# Patient Record
Sex: FEMALE | Race: BLACK OR AFRICAN AMERICAN
[De-identification: names, ages, dates, MRNs, and addresses within clinical notes are randomized per-mention and may not be internally consistent; named-entity substitution may affect disease eponyms.]

---

## 2018-04-18 ENCOUNTER — APPOINTMENT (OUTPATIENT)
Dept: NEPHROLOGY | Facility: CLINIC | Age: 58
End: 2018-04-18
Payer: SELF-PAY

## 2018-04-18 VITALS
BODY MASS INDEX: 30.96 KG/M2 | HEIGHT: 69 IN | DIASTOLIC BLOOD PRESSURE: 91 MMHG | HEART RATE: 76 BPM | WEIGHT: 209 LBS | SYSTOLIC BLOOD PRESSURE: 139 MMHG

## 2018-04-18 DIAGNOSIS — D63.1 CHRONIC KIDNEY DISEASE, STAGE 3 (MODERATE): ICD-10-CM

## 2018-04-18 DIAGNOSIS — N18.3 CHRONIC KIDNEY DISEASE, STAGE 3 (MODERATE): ICD-10-CM

## 2018-04-18 DIAGNOSIS — E87.2 ACIDOSIS: ICD-10-CM

## 2018-04-18 DIAGNOSIS — N18.5 CHRONIC KIDNEY DISEASE, STAGE 5: ICD-10-CM

## 2018-04-18 DIAGNOSIS — D63.1 ANEMIA IN CHRONIC KIDNEY DISEASE: ICD-10-CM

## 2018-04-18 PROCEDURE — 36415 COLL VENOUS BLD VENIPUNCTURE: CPT

## 2018-04-18 PROCEDURE — 99204 OFFICE O/P NEW MOD 45 MIN: CPT | Mod: 25

## 2018-04-19 PROBLEM — N18.3 ANEMIA IN STAGE 3 CHRONIC KIDNEY DISEASE: Status: ACTIVE | Noted: 2018-04-19

## 2018-04-19 LAB
25(OH)D3 SERPL-MCNC: 10.5 NG/ML
ALBUMIN SERPL ELPH-MCNC: 3.9 G/DL
ANION GAP SERPL CALC-SCNC: 13 MMOL/L
BASOPHILS # BLD AUTO: 0.02 K/UL
BASOPHILS NFR BLD AUTO: 0.6 %
BUN SERPL-MCNC: 9 MG/DL
CALCIUM SERPL-MCNC: 10.3 MG/DL
CHLORIDE SERPL-SCNC: 107 MMOL/L
CO2 SERPL-SCNC: 24 MMOL/L
CREAT SERPL-MCNC: 0.9 MG/DL
CREAT SPEC-SCNC: 156 MG/DL
CREAT/PROT UR: 0.1 RATIO
EOSINOPHIL # BLD AUTO: 0.12 K/UL
EOSINOPHIL NFR BLD AUTO: 3.9 %
GLUCOSE SERPL-MCNC: 131 MG/DL
HCT VFR BLD CALC: 35 %
HGB BLD-MCNC: 11.1 G/DL
IMM GRANULOCYTES NFR BLD AUTO: 0.3 %
LYMPHOCYTES # BLD AUTO: 0.59 K/UL
LYMPHOCYTES NFR BLD AUTO: 19 %
MAGNESIUM SERPL-MCNC: 1.5 MG/DL
MAN DIFF?: NORMAL
MCHC RBC-ENTMCNC: 25.6 PG
MCHC RBC-ENTMCNC: 31.7 GM/DL
MCV RBC AUTO: 80.6 FL
MONOCYTES # BLD AUTO: 0.22 K/UL
MONOCYTES NFR BLD AUTO: 7.1 %
NEUTROPHILS # BLD AUTO: 2.15 K/UL
NEUTROPHILS NFR BLD AUTO: 69.1 %
PHOSPHATE SERPL-MCNC: 2.4 MG/DL
PLATELET # BLD AUTO: 304 K/UL
POTASSIUM SERPL-SCNC: 4.4 MMOL/L
PROT UR-MCNC: 15 MG/DL
RBC # BLD: 4.34 M/UL
RBC # FLD: 14.4 %
SODIUM SERPL-SCNC: 144 MMOL/L
TACROLIMUS SERPL-MCNC: 17.2 NG/ML
URATE SERPL-MCNC: 5.3 MG/DL
WBC # FLD AUTO: 3.11 K/UL

## 2019-02-04 ENCOUNTER — OUTPATIENT (OUTPATIENT)
Dept: OUTPATIENT SERVICES | Facility: HOSPITAL | Age: 59
LOS: 1 days | End: 2019-02-04

## 2019-02-04 DIAGNOSIS — Z90.710 ACQUIRED ABSENCE OF BOTH CERVIX AND UTERUS: Chronic | ICD-10-CM

## 2019-02-04 DIAGNOSIS — Z98.89 OTHER SPECIFIED POSTPROCEDURAL STATES: Chronic | ICD-10-CM

## 2019-06-14 ENCOUNTER — APPOINTMENT (OUTPATIENT)
Dept: NEPHROLOGY | Facility: CLINIC | Age: 59
End: 2019-06-14
Payer: MEDICARE

## 2019-06-14 VITALS
HEIGHT: 69 IN | DIASTOLIC BLOOD PRESSURE: 84 MMHG | WEIGHT: 209 LBS | SYSTOLIC BLOOD PRESSURE: 156 MMHG | BODY MASS INDEX: 30.96 KG/M2 | HEART RATE: 71 BPM

## 2019-06-14 DIAGNOSIS — Z79.899 OTHER LONG TERM (CURRENT) DRUG THERAPY: ICD-10-CM

## 2019-06-14 DIAGNOSIS — Z94.0 KIDNEY TRANSPLANT STATUS: ICD-10-CM

## 2019-06-14 DIAGNOSIS — Q61.2 POLYCYSTIC KIDNEY, ADULT TYPE: ICD-10-CM

## 2019-06-14 DIAGNOSIS — E83.42 HYPOMAGNESEMIA: ICD-10-CM

## 2019-06-14 DIAGNOSIS — E55.9 VITAMIN D DEFICIENCY, UNSPECIFIED: ICD-10-CM

## 2019-06-14 DIAGNOSIS — Z94.0 OTHER LONG TERM (CURRENT) DRUG THERAPY: ICD-10-CM

## 2019-06-14 PROCEDURE — 99215 OFFICE O/P EST HI 40 MIN: CPT | Mod: 25

## 2019-06-14 PROCEDURE — 36415 COLL VENOUS BLD VENIPUNCTURE: CPT

## 2019-06-14 RX ORDER — MYCOPHENOLIC ACID 360 MG/1
360 TABLET, DELAYED RELEASE ORAL
Refills: 0 | Status: ACTIVE | COMMUNITY

## 2019-06-14 RX ORDER — TACROLIMUS 1 MG/1
1 CAPSULE ORAL
Refills: 0 | Status: ACTIVE | COMMUNITY

## 2019-06-14 RX ORDER — CLOTRIMAZOLE 10 MG/1
10 LOZENGE ORAL
Refills: 0 | Status: ACTIVE | COMMUNITY

## 2019-06-14 RX ORDER — METOPROLOL SUCCINATE 25 MG/1
25 TABLET, EXTENDED RELEASE ORAL
Refills: 0 | Status: ACTIVE | COMMUNITY

## 2019-06-14 RX ORDER — ONDANSETRON 4 MG/1
4 TABLET, ORALLY DISINTEGRATING ORAL
Refills: 0 | Status: ACTIVE | COMMUNITY

## 2019-06-14 NOTE — ASSESSMENT
[FreeTextEntry1] : Patient with known autosomal dominant polycystic kidney disease (  Maternal inheritance)\par \par Mother with end-stage kidney failure on dialysis ,  4 years ago.\par \par   w. 3 children in their late 20s and early 30s , None of them have autosomal dominant polycystic kidney disease.\par Patient with CKD stage V, planning for Pre Emptive  kidney transplantation as part of the swap program involving her oldest  daughter , age 32.\par Hypertension well-controlled.\par Transplant Nephrologist; Dr. Dahl  ,  at [facility].\par Transplant evaluation had been done at [facility].\par \par All the records  reviewed .\par \par To maintain followup.  BUN / Cr 101/9.1, P + 8.6, Hgb 7.7 gm/dl.,\par \par ABO : A + :\par \par Anemia management , ? HD as bridge to Transplant.\par \par Colonoscopy & Stress test - Neg :\par \par Post - Transplant F.U\par \par Will obtain all records & maintain F.U.\par \par Dental F.U,

## 2019-06-14 NOTE — REASON FOR VISIT
[Follow-Up] : a follow-up visit [FreeTextEntry1] : ADPKD [place] [facility] [place]

## 2019-06-14 NOTE — HISTORY OF PRESENT ILLNESS
[FreeTextEntry1] : Hypertensive with history of bronchial asthma.\par Postmenopausal.\par Family history of hypertension.\par Was on metoprolol ,amlodipine and inhalational therapy.\par Surgical history includes C-sections followed by hysterectomy and breast surgery.\par \par CKD - Known for  2 or 3 years , \par \par Etiology (  ADPKD )\par \par Now S/P TX.,

## 2019-06-14 NOTE — PHYSICAL EXAM
[General Appearance - Alert] : alert [General Appearance - In No Acute Distress] : in no acute distress [General Appearance - Well Nourished] : well nourished [General Appearance - Well Developed] : well developed [General Appearance - Well-Appearing] : healthy appearing [Sclera] : the sclera and conjunctiva were normal [PERRL With Normal Accommodation] : pupils were equal in size, round, and reactive to light [Extraocular Movements] : extraocular movements were intact [Strabismus] : no strabismus was seen [Retina] : no retinal hemorrhages, vessel changes or exudates seen on fundoscopic exam [Outer Ear] : the ears and nose were normal in appearance [Hearing Threshold Finger Rub Not Clay] : hearing was normal [Examination Of The Oral Cavity] : the lips and gums were normal [Both Tympanic Membranes Were Examined] : both tympanic membranes were normal [Nasal Cavity] : the nasal mucosa and septum were normal [Oropharynx] : the oropharynx was normal [Neck Appearance] : the appearance of the neck was normal [Neck Cervical Mass (___cm)] : no neck mass was observed [Jugular Venous Distention Increased] : there was no jugular-venous distention [Thyroid Diffuse Enlargement] : the thyroid was not enlarged [Thyroid Nodule] : there were no palpable thyroid nodules [Neck Circumference: ___] : neck circumference is [unfilled] [Respiration, Rhythm And Depth] : normal respiratory rhythm and effort [Exaggerated Use Of Accessory Muscles For Inspiration] : no accessory muscle use [Auscultation Breath Sounds / Voice Sounds] : lungs were clear to auscultation bilaterally [Chest Palpation] : palpation of the chest revealed no abnormalities [Apical Impulse] : the apical impulse was normal [Lungs Percussion] : the lungs were normal to percussion [Heart Rate And Rhythm] : heart rate was normal and rhythm regular [Heart Sounds] : normal S1 and S2 [Heart Sounds Gallop] : no gallops [Murmurs] : no murmurs [Heart Sounds Pericardial Friction Rub] : no pericardial rub [Arterial Pulses Carotid] : carotid pulses were normal with no bruits [Abdominal Aorta] : the abdominal aorta was normal [Arterial Pulses Femoral] : femoral pulses were normal without bruits [Full Pulse] : the pedal pulses are present [Veins - Varicosity Changes] : there were no varicosital changes [Edema] : there was no peripheral edema [Breast Palpation Mass] : no palpable masses [Breast Appearance] : normal in appearance [Bowel Sounds] : normal bowel sounds [Abdomen Soft] : soft [Abdomen Tenderness] : non-tender [Abdomen Mass (___ Cm)] : no abdominal mass palpated [Normal Sphincter Tone] : normal sphincter tone [No Rectal Mass] : no rectal mass [Cervical Lymph Nodes Enlarged Posterior Bilaterally] : posterior cervical [Cervical Lymph Nodes Enlarged Anterior Bilaterally] : anterior cervical [Supraclavicular Lymph Nodes Enlarged Bilaterally] : supraclavicular [Axillary Lymph Nodes Enlarged Bilaterally] : axillary [Femoral Lymph Nodes Enlarged Bilaterally] : femoral [Inguinal Lymph Nodes Enlarged Bilaterally] : inguinal [No CVA Tenderness] : no ~M costovertebral angle tenderness [No Spinal Tenderness] : no spinal tenderness [Abnormal Walk] : normal gait [Nail Clubbing] : no clubbing  or cyanosis of the fingernails [Musculoskeletal - Swelling] : no joint swelling seen [Motor Tone] : muscle strength and tone were normal [Skin Color & Pigmentation] : normal skin color and pigmentation [Skin Turgor] : normal skin turgor [] : no rash [Skin Lesions] : no skin lesions [Cranial Nerves] : cranial nerves 2-12 were intact [Deep Tendon Reflexes (DTR)] : deep tendon reflexes were 2+ and symmetric [Motor Exam] : the motor exam was normal [Sensation] : the sensory exam was normal to light touch and pinprick [No Focal Deficits] : no focal deficits [Impaired Insight] : insight and judgment were intact [Oriented To Time, Place, And Person] : oriented to person, place, and time [Affect] : the affect was normal [Mood] : the mood was normal [Memory Recent] : recent memory was not impaired [Memory Remote] : remote memory was not impaired [Occult Blood Positive] : stool was negative for occult blood

## 2019-06-15 LAB
BASOPHILS # BLD AUTO: 0.03 K/UL
BASOPHILS NFR BLD AUTO: 0.7 %
EOSINOPHIL # BLD AUTO: 0.15 K/UL
EOSINOPHIL NFR BLD AUTO: 3.6 %
HCT VFR BLD CALC: 40.4 %
HGB BLD-MCNC: 12.9 G/DL
IMM GRANULOCYTES NFR BLD AUTO: 0.2 %
LYMPHOCYTES # BLD AUTO: 0.96 K/UL
LYMPHOCYTES NFR BLD AUTO: 22.9 %
MAN DIFF?: NORMAL
MCHC RBC-ENTMCNC: 25.7 PG
MCHC RBC-ENTMCNC: 31.9 GM/DL
MCV RBC AUTO: 80.6 FL
MONOCYTES # BLD AUTO: 0.38 K/UL
MONOCYTES NFR BLD AUTO: 9 %
NEUTROPHILS # BLD AUTO: 2.67 K/UL
NEUTROPHILS NFR BLD AUTO: 63.6 %
PLATELET # BLD AUTO: 307 K/UL
RBC # BLD: 5.01 M/UL
RBC # FLD: 14.6 %
WBC # FLD AUTO: 4.2 K/UL

## 2019-06-16 LAB
25(OH)D3 SERPL-MCNC: 15.5 NG/ML
ALBUMIN SERPL ELPH-MCNC: 4.2 G/DL
ANION GAP SERPL CALC-SCNC: 10 MMOL/L
BUN SERPL-MCNC: 11 MG/DL
CALCIUM SERPL-MCNC: 10.5 MG/DL
CHLORIDE SERPL-SCNC: 109 MMOL/L
CO2 SERPL-SCNC: 27 MMOL/L
CREAT SERPL-MCNC: 1.11 MG/DL
CREAT SPEC-SCNC: 149 MG/DL
CREAT/PROT UR: 0.1 RATIO
GLUCOSE SERPL-MCNC: 64 MG/DL
MAGNESIUM SERPL-MCNC: 1.6 MG/DL
PHOSPHATE SERPL-MCNC: 3.1 MG/DL
POTASSIUM SERPL-SCNC: 4.5 MMOL/L
PROT UR-MCNC: 13 MG/DL
SODIUM SERPL-SCNC: 146 MMOL/L
TACROLIMUS SERPL-MCNC: 9.5 NG/ML
URATE SERPL-MCNC: 5.8 MG/DL

## 2019-06-16 RX ORDER — CHOLECALCIFEROL (VITAMIN D3) 1250 MCG
1.25 MG CAPSULE ORAL
Qty: 13 | Refills: 3 | Status: ACTIVE | COMMUNITY
Start: 2019-06-16 | End: 1900-01-01

## 2019-07-31 ENCOUNTER — APPOINTMENT (OUTPATIENT)
Dept: NEPHROLOGY | Facility: CLINIC | Age: 59
End: 2019-07-31

## 2019-09-18 ENCOUNTER — TRANSCRIPTION ENCOUNTER (OUTPATIENT)
Age: 59
End: 2019-09-18

## 2019-10-02 ENCOUNTER — TRANSCRIPTION ENCOUNTER (OUTPATIENT)
Age: 59
End: 2019-10-02

## 2021-04-29 ENCOUNTER — APPOINTMENT (OUTPATIENT)
Dept: RADIOLOGY | Facility: CLINIC | Age: 61
End: 2021-04-29
Payer: COMMERCIAL

## 2021-04-29 PROCEDURE — 73560 X-RAY EXAM OF KNEE 1 OR 2: CPT | Mod: 50

## 2022-12-05 ENCOUNTER — APPOINTMENT (OUTPATIENT)
Dept: RADIOLOGY | Facility: CLINIC | Age: 62
End: 2022-12-05

## 2022-12-05 PROCEDURE — 72190 X-RAY EXAM OF PELVIS: CPT

## 2023-03-29 ENCOUNTER — APPOINTMENT (OUTPATIENT)
Dept: ULTRASOUND IMAGING | Facility: CLINIC | Age: 63
End: 2023-03-29
Payer: COMMERCIAL

## 2023-03-29 PROCEDURE — 93971 EXTREMITY STUDY: CPT | Mod: LT
